# Patient Record
Sex: MALE | Race: OTHER | Employment: FULL TIME | URBAN - METROPOLITAN AREA
[De-identification: names, ages, dates, MRNs, and addresses within clinical notes are randomized per-mention and may not be internally consistent; named-entity substitution may affect disease eponyms.]

---

## 2019-09-17 ENCOUNTER — HOSPITAL ENCOUNTER (EMERGENCY)
Facility: HOSPITAL | Age: 39
Discharge: HOME | End: 2019-09-17
Payer: OTHER MISCELLANEOUS

## 2019-09-17 VITALS
TEMPERATURE: 99 F | OXYGEN SATURATION: 98 % | SYSTOLIC BLOOD PRESSURE: 123 MMHG | HEART RATE: 85 BPM | RESPIRATION RATE: 16 BRPM | DIASTOLIC BLOOD PRESSURE: 74 MMHG | WEIGHT: 200 LBS

## 2019-09-17 DIAGNOSIS — S81.012A LACERATION OF LEFT KNEE, INITIAL ENCOUNTER: Primary | ICD-10-CM

## 2019-09-17 PROCEDURE — 12002 RPR S/N/AX/GEN/TRNK2.6-7.5CM: CPT

## 2019-09-17 PROCEDURE — 99283 EMERGENCY DEPT VISIT LOW MDM: CPT

## 2019-09-17 PROCEDURE — 90471 IMMUNIZATION ADMIN: CPT

## 2019-09-17 NOTE — ED PROVIDER NOTES
Patient Seen in: [facility] AND [facility] Emergency Department    History   Patient presents with:  Laceration Abrasion (integumentary)    Stated Complaint: L Knee Lac from tool at work 20 min PTA    HPI    HPI: Adam Kamila is a 35year old male who pre Strain/sprain vs. contusion    ED Course   Labs Reviewed - No data to display    MDM     Radiology result:           Laceration repair:    Verbal consent was obtained from the patient.   The 3 cm laceration located medial L knee was anesthetized in the usua

## 2019-09-17 NOTE — ED INITIAL ASSESSMENT (HPI)
Patient presents with:  Laceration Abrasion (integumentary)    There were no vitals taken for this visit. PATIENT AOX3 TO ED VIA ems TO ED ROOM #hw19. PATIENT CO OF laceration to left lower extremity x today at work with belt dana.  3cm laceration note

## (undated) NOTE — LETTER
Date & Time: 9/17/2019, 2:25 PM  Patient: Marianne Bailey  Encounter Provider(s):    ANKIT Corral       To Whom It May Concern:    Marianne Bailey was seen and treated in our department on 9/17/2019.  He can return to work with these li

## (undated) NOTE — ED AVS SNAPSHOT
Yohan Stanton   MRN: [id number]    Department:  [facility] Emergency Department   Date of Visit:  9/17/2019           Disclosure     Insurance plans vary and the physician(s) referred by the ER may not be covered by your plan.  Please cont CARE PHYSICIAN AT ONCE OR RETURN IMMEDIATELY TO THE EMERGENCY DEPARTMENT. If you have been prescribed any medication(s), please fill your prescription right away and begin taking the medication(s) as directed.   If you believe that any of the medications